# Patient Record
Sex: MALE | Race: WHITE | NOT HISPANIC OR LATINO | Employment: UNEMPLOYED | URBAN - METROPOLITAN AREA
[De-identification: names, ages, dates, MRNs, and addresses within clinical notes are randomized per-mention and may not be internally consistent; named-entity substitution may affect disease eponyms.]

---

## 2018-07-17 ENCOUNTER — CONVERSION ENCOUNTER (OUTPATIENT)
Dept: ORTHOPEDIC SURGERY | Facility: CLINIC | Age: 38
End: 2018-07-17

## 2018-07-17 ENCOUNTER — OFFICE VISIT CONVERTED (OUTPATIENT)
Dept: ORTHOPEDIC SURGERY | Facility: CLINIC | Age: 38
End: 2018-07-17
Attending: PHYSICIAN ASSISTANT

## 2018-08-02 ENCOUNTER — OFFICE VISIT CONVERTED (OUTPATIENT)
Dept: ORTHOPEDIC SURGERY | Facility: CLINIC | Age: 38
End: 2018-08-02
Attending: ORTHOPAEDIC SURGERY

## 2021-05-16 VITALS — WEIGHT: 195 LBS | HEIGHT: 72 IN | RESPIRATION RATE: 18 BRPM | BODY MASS INDEX: 26.41 KG/M2

## 2021-05-16 VITALS — HEIGHT: 72 IN | RESPIRATION RATE: 16 BRPM | BODY MASS INDEX: 26.41 KG/M2 | WEIGHT: 195 LBS

## 2021-08-27 ENCOUNTER — HOSPITAL ENCOUNTER (EMERGENCY)
Facility: HOSPITAL | Age: 41
Discharge: HOME | End: 2021-08-27
Attending: EMERGENCY MEDICINE | Admitting: EMERGENCY MEDICINE

## 2021-08-27 VITALS
TEMPERATURE: 99 F | BODY MASS INDEX: 30.48 KG/M2 | SYSTOLIC BLOOD PRESSURE: 143 MMHG | HEART RATE: 85 BPM | DIASTOLIC BLOOD PRESSURE: 88 MMHG | OXYGEN SATURATION: 96 % | WEIGHT: 225 LBS | HEIGHT: 72 IN | RESPIRATION RATE: 18 BRPM

## 2021-08-27 DIAGNOSIS — S61.259A BAT BITE OF FINGER, INITIAL ENCOUNTER: Primary | ICD-10-CM
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
## 2021-08-27 PROCEDURE — 90675 RABIES VACCINE IM: CPT | Performed by: PHYSICIAN ASSISTANT

## 2021-08-27 PROCEDURE — 99283 EMERGENCY DEPT VISIT LOW MDM: CPT

## 2021-08-27 PROCEDURE — 90375 RABIES IG IM/SC: CPT | Performed by: PHYSICIAN ASSISTANT

## 2021-08-27 PROCEDURE — 25010000002 RABIES IMMUNE GLOBULIN 300 UNIT/ML SOLUTION: Performed by: PHYSICIAN ASSISTANT

## 2021-08-27 PROCEDURE — 96372 THER/PROPH/DIAG INJ SC/IM: CPT

## 2021-08-27 PROCEDURE — 90471 IMMUNIZATION ADMIN: CPT

## 2021-08-27 PROCEDURE — 25010000002 RABIES VACCINE PER 1 ML: Performed by: PHYSICIAN ASSISTANT

## 2021-08-27 RX ADMIN — RABIES IMMUNE GLOBULIN (HUMAN) 2040 UNITS: 300 INJECTION, SOLUTION INFILTRATION; INTRAMUSCULAR at 21:46

## 2021-08-27 RX ADMIN — RABIES VACCINE 2.5 UNITS: KIT at 21:47

## 2021-08-28 NOTE — ED PROVIDER NOTES
EMERGENCY DEPARTMENT ENCOUNTER      Room Number: HALA/HA    History is provided by the patient, no translation services needed    HPI:    Chief complaint: Bat bite    Location: Right thumb    Quality/Severity: Aching    Timing/Duration: 2 days    Modifying Factors:     Associated Symptoms: No swelling, no fever    Narrative: Pt is a 41 y.o. male who presents complaining of bat bite to his right thumb 2 days ago.  Patient is complaining of mild aching.  Patient states that he had a tetanus vaccine 2 days ago.  Patient denies ever having rabies vaccine in the past.      PMD: Provider, No Known    REVIEW OF SYSTEMS  Review of Systems   Constitutional: Negative for chills and fever.   Eyes: Negative for pain and visual disturbance.   Respiratory: Negative for cough and shortness of breath.    Cardiovascular: Negative for chest pain and palpitations.   Gastrointestinal: Negative for abdominal pain, nausea and vomiting.   Genitourinary: Negative for difficulty urinating, dysuria and flank pain.   Musculoskeletal: Negative for gait problem and myalgias.   Skin: Positive for wound. Negative for rash.   Neurological: Negative for dizziness, syncope, numbness and headaches.   Psychiatric/Behavioral: Negative for confusion and suicidal ideas. The patient is not nervous/anxious.          PAST MEDICAL HISTORY  Active Ambulatory Problems     Diagnosis Date Noted   • No Active Ambulatory Problems     Resolved Ambulatory Problems     Diagnosis Date Noted   • No Resolved Ambulatory Problems     Past Medical History:   Diagnosis Date   • Asthma        PAST SURGICAL HISTORY  Past Surgical History:   Procedure Laterality Date   • ADENOIDECTOMY         FAMILY HISTORY  History reviewed. No pertinent family history.    SOCIAL HISTORY  Social History     Socioeconomic History   • Marital status: Unknown     Spouse name: Not on file   • Number of children: Not on file   • Years of education: Not on file   • Highest education level: Not on  file   Tobacco Use   • Smoking status: Never Smoker   Vaping Use   • Vaping Use: Never used   Substance and Sexual Activity   • Alcohol use: Not Currently   • Drug use: Not Currently   • Sexual activity: Defer       ALLERGIES  Patient has no known allergies.    No current facility-administered medications for this encounter.  No current outpatient medications on file.    PHYSICAL EXAM  ED Triage Vitals [08/27/21 2008]   Temp Heart Rate Resp BP SpO2   99 °F (37.2 °C) 85 18 143/88 96 %      Temp src Heart Rate Source Patient Position BP Location FiO2 (%)   Oral Monitor Sitting Left arm --       Physical Exam  Vitals and nursing note reviewed.   HENT:      Head: Normocephalic and atraumatic.   Eyes:      Conjunctiva/sclera: Conjunctivae normal.   Cardiovascular:      Rate and Rhythm: Normal rate and regular rhythm.      Heart sounds: Normal heart sounds.   Pulmonary:      Effort: Pulmonary effort is normal. No respiratory distress.      Breath sounds: Normal breath sounds.   Abdominal:      General: Bowel sounds are normal. There is no distension.      Palpations: Abdomen is soft.      Tenderness: There is no abdominal tenderness.   Musculoskeletal:         General: Normal range of motion.      Cervical back: Normal range of motion and neck supple.   Skin:     General: Skin is warm and dry.      Comments:   Puncture wound to right thumb, no signs of infection, no erythema, no drainage, no pain with palpation   Neurological:      Mental Status: He is alert and oriented to person, place, and time.   Psychiatric:         Mood and Affect: Mood and affect normal.           LAB RESULTS  Lab Results (last 24 hours)     ** No results found for the last 24 hours. **                RADIOLOGY  No Radiology Exams Resulted Within Past 24 Hours        PROCEDURES  Procedures      PROGRESS AND CONSULTS  ED Course as of Aug 27 2216   Fri Aug 27, 2021   2119 0.5 mL the immunoglobulin was injected into the right thumb    [GT]   2214  41-year-old male presents to the ED in custody with complaints of a bat bite 2 days ago.  Patient states that he was in line for child when he got bit by a in his right thumb.  Patient is he did have his tetanus vaccine 2 days ago.  Patient denies being vaccinated for rabies in the past.  After history and physical exam patient is noted to have a healing puncture wound to his right thumb.  Patient was given rabies vaccine and immunoglobulin.  Patient's right thumb was infiltrated with immunoglobulin.  Patient tolerated procedure well.  Patient will be discharged into custody.  Patient was given instructions that he would need 3 more vaccine shots.  Instructed patient that if he had any worsening symptoms to follow-up as needed.  Patient expressed verbal understanding of plan of care.    [GT]      ED Course User Index  [GT] Светлана Keys PA-C           MEDICAL DECISION MAKING    MDM       DIAGNOSIS  Final diagnoses:   Bat bite of finger, initial encounter       Latest Documented Vital Signs:  As of 22:16 EDT  BP- 143/88 HR- 85 Temp- 99 °F (37.2 °C) (Oral) O2 sat- 96%    DISPOSITION  Discharged  home        Discussed pertinent findings with the patient/family.  Patient/Family voiced understanding of need to follow-up for recheck and further testing as needed.  Return to the Emergency Department warnings were given.         Medication List      No changes were made to your prescriptions during this visit.             Follow-up Information     [facility].    Specialty: Infusion Therapy  Contact information:  [address] [place] [id number] [phone]           Call  Provider, No Known.    Why: To schedule a follow up appointment, As needed  Contact information:  [facility] [address] [phone]                     Dictated utilizing Dragon dictation     Светлана Keys PA-C  08/27/21 4601

## 2021-08-30 ENCOUNTER — HOSPITAL ENCOUNTER (OUTPATIENT)
Dept: INFUSION THERAPY | Facility: HOSPITAL | Age: 41
Discharge: HOME | End: 2021-08-30
Admitting: EMERGENCY MEDICINE

## 2021-08-30 VITALS
HEART RATE: 73 BPM | DIASTOLIC BLOOD PRESSURE: 78 MMHG | RESPIRATION RATE: 16 BRPM | WEIGHT: 225 LBS | TEMPERATURE: 97.9 F | BODY MASS INDEX: 30.48 KG/M2 | HEIGHT: 72 IN | OXYGEN SATURATION: 94 % | SYSTOLIC BLOOD PRESSURE: 129 MMHG

## 2021-08-30 DIAGNOSIS — Z23 RABIES, NEED FOR PROPHYLACTIC VACCINATION AGAINST: Primary | ICD-10-CM

## 2021-08-30 PROCEDURE — 90471 IMMUNIZATION ADMIN: CPT

## 2021-08-30 PROCEDURE — 90675 RABIES VACCINE IM: CPT | Performed by: EMERGENCY MEDICINE

## 2021-08-30 PROCEDURE — 25010000002 RABIES VACCINE PER 1 ML: Performed by: EMERGENCY MEDICINE

## 2021-08-30 RX ORDER — LEVALBUTEROL TARTRATE 45 UG/1
1-2 AEROSOL, METERED ORAL EVERY 4 HOURS PRN
COMMUNITY

## 2021-08-30 RX ADMIN — RABIES VACCINE 2.5 UNITS: KIT at 13:09

## 2021-09-03 ENCOUNTER — HOSPITAL ENCOUNTER (OUTPATIENT)
Dept: INFUSION THERAPY | Facility: HOSPITAL | Age: 41
Setting detail: INFUSION SERIES
Discharge: HOME | End: 2021-09-03

## 2021-09-10 ENCOUNTER — APPOINTMENT (OUTPATIENT)
Dept: INFUSION THERAPY | Facility: HOSPITAL | Age: 41
End: 2021-09-10